# Patient Record
Sex: FEMALE | Race: WHITE | Employment: FULL TIME | URBAN - NONMETROPOLITAN AREA
[De-identification: names, ages, dates, MRNs, and addresses within clinical notes are randomized per-mention and may not be internally consistent; named-entity substitution may affect disease eponyms.]

---

## 2024-11-17 ENCOUNTER — APPOINTMENT (OUTPATIENT)
Dept: GENERAL RADIOLOGY | Age: 24
End: 2024-11-17
Payer: COMMERCIAL

## 2024-11-17 ENCOUNTER — APPOINTMENT (OUTPATIENT)
Dept: ULTRASOUND IMAGING | Age: 24
End: 2024-11-17
Payer: COMMERCIAL

## 2024-11-17 ENCOUNTER — HOSPITAL ENCOUNTER (EMERGENCY)
Age: 24
Discharge: HOME | End: 2024-11-17
Attending: EMERGENCY MEDICINE
Payer: COMMERCIAL

## 2024-11-17 ENCOUNTER — HOSPITAL ENCOUNTER (OUTPATIENT)
Age: 24
Discharge: HOME | End: 2024-11-17
Attending: NURSE PRACTITIONER | Admitting: NURSE PRACTITIONER
Payer: COMMERCIAL

## 2024-11-17 VITALS
DIASTOLIC BLOOD PRESSURE: 68 MMHG | OXYGEN SATURATION: 100 % | SYSTOLIC BLOOD PRESSURE: 116 MMHG | TEMPERATURE: 98.2 F | RESPIRATION RATE: 16 BRPM | HEART RATE: 97 BPM

## 2024-11-17 VITALS
HEART RATE: 83 BPM | HEIGHT: 66 IN | BODY MASS INDEX: 25.71 KG/M2 | WEIGHT: 160 LBS | OXYGEN SATURATION: 98 % | TEMPERATURE: 98.8 F | RESPIRATION RATE: 17 BRPM | DIASTOLIC BLOOD PRESSURE: 71 MMHG | SYSTOLIC BLOOD PRESSURE: 120 MMHG

## 2024-11-17 DIAGNOSIS — S50.01XA CONTUSION OF RIGHT ELBOW, INITIAL ENCOUNTER: ICD-10-CM

## 2024-11-17 DIAGNOSIS — Z3A.26 26 WEEKS GESTATION OF PREGNANCY: ICD-10-CM
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
## 2024-11-17 PROBLEM — Z3A.25 25 WEEKS GESTATION OF PREGNANCY: Status: ACTIVE | Noted: 2024-11-17

## 2024-11-17 LAB
ABO/RH: NORMAL
ANTIBODY SCREEN: NORMAL
FETAL RBC/2000 RBC NFR BLD KLEIH BETKE: 0 %

## 2024-11-17 PROCEDURE — 85460 HEMOGLOBIN FETAL: CPT

## 2024-11-17 PROCEDURE — 36415 COLL VENOUS BLD VENIPUNCTURE: CPT

## 2024-11-17 PROCEDURE — 76815 OB US LIMITED FETUS(S): CPT

## 2024-11-17 PROCEDURE — 86850 RBC ANTIBODY SCREEN: CPT

## 2024-11-17 PROCEDURE — 86901 BLOOD TYPING SEROLOGIC RH(D): CPT

## 2024-11-17 PROCEDURE — 99213 OFFICE O/P EST LOW 20 MIN: CPT

## 2024-11-17 PROCEDURE — 86900 BLOOD TYPING SEROLOGIC ABO: CPT

## 2024-11-17 PROCEDURE — 73080 X-RAY EXAM OF ELBOW: CPT

## 2024-11-17 ASSESSMENT — PAIN - FUNCTIONAL ASSESSMENT: PAIN_FUNCTIONAL_ASSESSMENT: 0-10

## 2024-11-17 ASSESSMENT — PAIN DESCRIPTION - LOCATION: LOCATION: ARM;ABDOMEN

## 2024-11-17 ASSESSMENT — PAIN DESCRIPTION - DESCRIPTORS: DESCRIPTORS: SORE

## 2024-11-17 ASSESSMENT — PAIN DESCRIPTION - ORIENTATION: ORIENTATION: RIGHT

## 2024-11-17 ASSESSMENT — PAIN SCALES - GENERAL: PAINLEVEL_OUTOF10: 1

## 2024-11-17 NOTE — ED NOTES
Spoke RN in OB regarding patient being cleared by MD. Per OB RN, patient can be brought to OB floor at this time.
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
## 2024-11-17 NOTE — ED NOTES
Spoke with RN on OB floor regarding tocometry monitoring and fetal heart tones for patient in ED. Per RN, if patient cleared by MD, she can go to OB floor for tocometry monitoring, FHT, KB stain, etc. MD notified at this time.

## 2024-11-18 NOTE — FORENSIC NURSE
Discharge instructions given to patient. Return to [facility] facility if contractions greater than 4-6 in an hour, decreased fetal movement, vaginal bleeding, rupture or membranes, etc. Encouraged patient to follow up with her [place] provider this week. Verbalized understanding of instructions. Discharged to home.

## 2024-11-18 NOTE — FLOWSHEET NOTE
Blood bank called stating pt is A+ and did not need the KB stain. This nurse returned call and instructed the tech to complete the kb stain as it was to determine if fetal blood cells were mixed with the mother's blood cells. Tech states they needed the type and screen order placed in order to complete it. Order placed for type and screen as requested per lab. This nurse confirmed again that they would complete the kb stain. Lab confirmed that they would run the test.

## 2024-11-21 ASSESSMENT — ENCOUNTER SYMPTOMS
VOMITING: 0
ABDOMINAL PAIN: 0
SHORTNESS OF BREATH: 0
BACK PAIN: 0